# Patient Record
(demographics unavailable — no encounter records)

---

## 2025-01-29 NOTE — HISTORY OF PRESENT ILLNESS
[de-identified] : 64 year old male presents complaining of right lower leg pain and swelling. He denies specific injury. [place]. He felt some tightness and discomfort but was able to get up the stairs and continue his day without disruption. He notes that he walked on the treadmill on 01/18/25 with no significant pain. On 1/22/25, he noticed pain, stiffness and swelling from the calf into the ankle. He also noticed ecchymosis about the foot and ankle. He was seen at [facility]. Doppler was negative for DVT. The swelling has resolved and ecchymosis has improved. Of note, he had open heart surgery and is on baby aspirin.

## 2025-01-29 NOTE — PHYSICAL EXAM
[de-identified] : Constitutional o Appearance : well-nourished, well developed, alert, in no acute distress  Head and Face o Head :  Inspection : atraumatic, normocephalic o Face :  Inspection : no visible rash or discoloration Respiratory o Respiratory Effort: breathing unlabored  Neurologic o Mental Status Examination :  Orientation : grossly oriented to person, place and time Psychiatric o Mood and Affect: mood normal, affect appropriate  Cardiovascular o Observation/Palpation : - no swelling  Lymphatic o Additional Nodes : No palpable lymph nodes present   Right Lower Extremity o Knee :  Inspection/Palpation : medial gastroc tenderness to palpation, resolving ecchymosis, no swelling  Range of Motion : active flexion and extension full and painless, no crepitance  Stability : no valgus or varus instability present on provocative testing  Strength : flexion and extension 4/5  Tests and Signs : all tests for stability normal o Reflexes : patellar tendon reflex 2+, ankle reflex 2+, Babinski sign absent (toes downgoing)   o Right Ankle : no swelling all planes  Inspection/Palpation : resolving ecchymosis of his right foot,  tenderness to palpation,mild to moderate swelling of right foot and ankle   Range of Motion : arc of motion full and painless in  Stability : no joint instability on provocative testing  Strength : all muscles 5/5 o Skin : no erythema or ecchymosis present o Sensation : sensation to pin intact o Tests and Signs : negative Garcia test, negative Anterior Drawer   o Right Lower Leg : no tenderness, swelling or deformities  strength 4/5  Left Lower Extremity  o Knee :  Inspection/Palpation : no tenderness to palpation, no swelling  Range of Motion : active flexion and extension full and painless, no crepitance  Stability : no valgus or varus instability present on provocative testing  Strength : flexion and extension 5/5 no erythema or ecchymosis present o Skin o Reflexes : patellar tendon reflex 2+, ankle reflex 2+, Babinski sign absent (toes downgoing)   o Left Lower Leg : no tenderness, swelling or deformities  o Peripheral Vascular System :  Dorsalis Pedis Artery : pulse 2+  Posterior Tibial Artery : pulse 2+   Gait and Station o Gait: gait normal, wearing sneakers, well stretched hamstrings, 4/5 RLE, 5/5 LLE

## 2025-01-29 NOTE — DISCUSSION/SUMMARY
[de-identified] : I went over the pathophysiology of the patient's symptoms in great detail with the patient. At this time, he will start a course of physical therapy for strengthening and flexibility. A prescription was provided. We discussed the use of ice, Tylenol and anti-inflammatories to relieve pain. I instructed the patient on frequent icing, ankle pumping, leg lifts, tightening exercises, stretching and ROM exercises. He should avoid making quick movements. He should focus on isolating the RLE. He needs to avoid high-impact activities such as running and jumping or riding a treadmill. I recommend alternative activities such as riding a stationary bike or elliptical on low tension. He should focus on light weight and high repetition exercises. He should avoid squatting and kneeling. A discussion ensued about shoe wear modifications. I recommend the patient uses a lift in his shoes.   All of their questions were answered. They understand and consent to the plan.   FU in 4-6 weeks.

## 2025-01-29 NOTE — ADDENDUM
[FreeTextEntry1] : I, CECILIO HARRIS, acted solely as a scribe for Dr. Fermín De Paz on this date 01/29/2025.  All medical record entries made by the Scribe were at my, Dr. Fermín De Paz, direction and personally dictated by me on 01/29/2025. I have reviewed the chart and agree that the record accurately reflects my personal performance of the history, physical exam, assessment and plan. I have also personally directed, reviewed, and agreed with the chart.